# Patient Record
Sex: MALE | Race: WHITE | URBAN - METROPOLITAN AREA
[De-identification: names, ages, dates, MRNs, and addresses within clinical notes are randomized per-mention and may not be internally consistent; named-entity substitution may affect disease eponyms.]

---

## 2017-02-14 ENCOUNTER — TRANSFERRED RECORDS (OUTPATIENT)
Dept: HEALTH INFORMATION MANAGEMENT | Facility: CLINIC | Age: 42
End: 2017-02-14

## 2017-02-14 LAB — EJECTION FRACTION: 60

## 2017-03-02 ENCOUNTER — OFFICE VISIT (OUTPATIENT)
Dept: FAMILY MEDICINE | Facility: CLINIC | Age: 42
End: 2017-03-02
Payer: COMMERCIAL

## 2017-03-02 VITALS
HEART RATE: 71 BPM | BODY MASS INDEX: 23.58 KG/M2 | SYSTOLIC BLOOD PRESSURE: 128 MMHG | DIASTOLIC BLOOD PRESSURE: 74 MMHG | HEIGHT: 70 IN | WEIGHT: 164.7 LBS | TEMPERATURE: 97.2 F | OXYGEN SATURATION: 97 %

## 2017-03-02 DIAGNOSIS — G40.909 SEIZURE DISORDER (H): ICD-10-CM

## 2017-03-02 DIAGNOSIS — I47.10 PSVT (PAROXYSMAL SUPRAVENTRICULAR TACHYCARDIA) (H): Primary | ICD-10-CM

## 2017-03-02 PROCEDURE — 36415 COLL VENOUS BLD VENIPUNCTURE: CPT | Performed by: INTERNAL MEDICINE

## 2017-03-02 PROCEDURE — 99214 OFFICE O/P EST MOD 30 MIN: CPT | Performed by: INTERNAL MEDICINE

## 2017-03-02 PROCEDURE — 80164 ASSAY DIPROPYLACETIC ACD TOT: CPT | Performed by: INTERNAL MEDICINE

## 2017-03-02 RX ORDER — DIVALPROEX SODIUM 500 MG
1000 TABLET, EXTENDED RELEASE 24 HR ORAL DAILY
Qty: 180 TABLET | Refills: 3 | Status: SHIPPED | OUTPATIENT
Start: 2017-03-02

## 2017-03-02 NOTE — LETTER
[address] [place] [address]    March 6, 2017    Horacio Roy  [address]  [facility] [address]          Dear Horacio,    This is within normal limits.    Results for orders placed or performed in visit on 03/02/17   Valproic acid   Result Value Ref Range    Valproic Acid Level 65 50 - 100 mg/L       If you have any questions or concerns, please me or my clinic team at [phone].      Sincerely,        Kimani Esquivel MD/bt

## 2017-03-02 NOTE — NURSING NOTE
"Chief Complaint   Patient presents with     Hospital F/U       Initial /74 (BP Location: Left arm, Patient Position: Chair, Cuff Size: Adult Large)  Pulse 71  Temp 97.2  F (36.2  C)  Ht 5' 10.47\" (1.79 m)  Wt 164 lb 11.2 oz (74.7 kg)  SpO2 97%  BMI 23.32 kg/m2 Estimated body mass index is 23.32 kg/(m^2) as calculated from the following:    Height as of this encounter: 5' 10.47\" (1.79 m).    Weight as of this encounter: 164 lb 11.2 oz (74.7 kg).  Medication Reconciliation: complete   Loreto Avilez MA      "

## 2017-03-02 NOTE — PROGRESS NOTES
SUBJECTIVE:                                                    Horacio Roy is a 41 year old male who presents to clinic today for the following health issues:       PSVT (paroxysmal supraventricular tachycardia) (H)  Seizure disorder (H)     A patient I have seen only 1-2 times before, generally in excellent health. Since our last office visit he got , now wife has 2 horses, has a farm now, a lot over the last year. Also got a puppy, lots of home issues .     Meanwhile today is a post hospital discharge follow up office visit , for complete details please see care everywhere, he was at [facility] Follow-up Visit:    Hospital/Nursing Home/IP Rehab Facility: [facility] [place]  Date of Admission: 2/24/17  Date of Discharge: 2/25/17  Reason(s) for Admission: Tacacardia            Problems taking medications regularly:  None       Medication changes since discharge: None       Problems adhering to non-medication therapy:  None    Summary of hospitalization:  CareEverywhere information obtained and reviewed  Diagnostic Tests/Treatments reviewed.  Follow up needed: none  Other Healthcare Providers Involved in Patient s Care:         [facility]  Update since discharge: improved.     Post Discharge Medication Reconciliation: discharge medications reconciled and changed, per note/orders (see AVS).  Plan of care communicated with patient     Coding guidelines for this visit:  Type of Medical   Decision Making Face-to-Face Visit       within 7 Days of discharge Face-to-Face Visit        within 14 days of discharge   Moderate Complexity 16656 [id number]   High Complexity 24482 44279            Problem list and histories reviewed & adjusted, as indicated.  Additional history: as documented    Patient Active Problem List   Diagnosis     Seizure disorder (HCC)     Hyperlipidemia with target LDL less than 160     Increased frequency of urination     Past Surgical History   Procedure Laterality Date     Hernia  "repair       Orthopedic surgery  1998     minh procedure       Social History   Substance Use Topics     Smoking status: Never Smoker     Smokeless tobacco: Not on file     Alcohol use Yes     History reviewed. No pertinent family history.      Current Outpatient Prescriptions   Medication Sig Dispense Refill     DEPAKOTE  MG 24 hr tablet Take 2 tablets (1,000 mg) by mouth daily 180 tablet 3     sildenafil (VIAGRA) 100 MG tablet Take 0.5-1 tablets ( mg) by mouth daily as needed for erectile dysfunction Take 30 min to 4 hours before intercourse.  Never use with nitroglycerin, terazosin or doxazosin. 6 tablet 11     [DISCONTINUED] DEPAKOTE  MG 24 hr tablet Take 2 tablets (1,000 mg) by mouth daily 180 tablet 3     No Known Allergies  BP Readings from Last 3 Encounters:   03/02/17 128/74   03/31/16 100/68   04/27/15 110/67    Wt Readings from Last 3 Encounters:   03/02/17 164 lb 11.2 oz (74.7 kg)   03/31/16 166 lb (75.3 kg)   04/27/15 169 lb 6.4 oz (76.8 kg)                  Labs reviewed in EPIC    Reviewed and updated as needed this visit by clinical staff       Reviewed and updated as needed this visit by Provider         ROS:  Constitutional, HEENT, cardiovascular, pulmonary, gi and gu systems are negative, except as otherwise noted.    OBJECTIVE:                                                    /74 (BP Location: Left arm, Patient Position: Chair, Cuff Size: Adult Large)  Pulse 71  Temp 97.2  F (36.2  C)  Ht 5' 10.47\" (1.79 m)  Wt 164 lb 11.2 oz (74.7 kg)  SpO2 97%  BMI 23.32 kg/m2  Body mass index is 23.32 kg/(m^2).  GENERAL APPEARANCE: healthy, alert and no distress  EYES: Eyes grossly normal to inspection, PERRL and conjunctivae and sclerae normal  RESP: lungs clear to auscultation - no rales, rhonchi or wheezes  CV: regular rates and rhythm, normal S1 S2, no S3 or S4 and no murmur, click or rub  NEURO: Normal strength and tone, mentation intact and speech normal  PSYCH: mentation " appears normal and affect normal/bright    Diagnostic test results:  Diagnostic Test Results:  Orders Placed This Encounter   Procedures     Valproic acid          ASSESSMENT/PLAN:                                                    1. PSVT (paroxysmal supraventricular tachycardia) (H)  The long and short of it is that this gentleman had a new problem, a burst of a rapid heart action  That is empirically diagnosed as an supraventricular tachycardia. Further cardiac monitoring is not to be pursued. He was educated in the valsalva maneuver and is symptoms free at this point. Most of today was spent in education. No further follow up beyond the annual office visit unless symptoms / on an as needed basis       2. Seizure disorder (H)  An an unrelated matter. Has a history of a seizure disorder and continues with valproic acid [ depakote ]    - DEPAKOTE  MG 24 hr tablet; Take 2 tablets (1,000 mg) by mouth daily  Dispense: 180 tablet; Refill: 3      Follow up with Provider - 6-12 months / on an as needed basis      Kimani Esquivel MD  [facility]

## 2017-03-02 NOTE — MR AVS SNAPSHOT
After Visit Summary   3/2/2017    Horacio Roy    MRN: [id number]           Patient Information     Date Of Birth          1975        Visit Information        Provider Department      3/2/2017 5:50 PM Kimani Esquivel MD [facility]        Today's Diagnoses     PSVT (paroxysmal supraventricular tachycardia) (H)    -  1    Seizure disorder (H)          Care Instructions    [facility]    If you have any questions regarding to your visit please contact your care team:     Team Pink:   Clinic Hours Telephone Number   Internal Medicine:  Dr. Monique Castillo NP       7am-7pm  Monday - Thursday   7am-5pm  Fridays  [phone]  (Appointment scheduling available 24/7)    Questions about your visit?  Team Line  [phone]   Urgent Care - [place] and [place] - 11am-9pm Monday-Friday Saturday-Sunday- 9am-5pm   [place] - 5pm-9pm Monday-Friday Saturday-Sunday- 9am-5pm  [phone] - [place]   [phone] - [place]       What options do I have for visits at the clinic other than the traditional office visit?  To expand how we care for you, many of our providers are utilizing electronic visits (e-visits) and telephone visits, when medically appropriate, for interactions with their patients rather than a visit in the clinic.   We also offer nurse visits for many medical concerns. Just like any other service, we will bill your insurance company for this type of visit based on time spent on the phone with your provider. Not all insurance companies cover these visits. Please check with your medical insurance if this type of visit is covered. You will be responsible for any charges that are not paid by your insurance.      E-visits via [employer]:  generally incur a $35.00 fee.  Telephone visits:  Time spent on the phone: *charged based on time that is spent on the phone in increments of 10 minutes. Estimated cost:   5-10 mins  "$30.00   11-20 mins. $59.00   21-30 mins. $85.00   Use [employer]usehart (secure email communication and access to your chart) to send your primary care provider a message or make an appointment. Ask someone on your Team how to sign up for [employer].    For a Price Quote for your services, please call our "[employer]" Line at [phone].    As always, Thank you for trusting us with your health care needs!            Follow-ups after your visit        Who to contact     If you have questions or need follow up information about today's clinic visit or your schedule please contact [facility] directly at [phone].  Normal or non-critical lab and imaging results will be communicated to you by LoopFusehart, letter or phone within 4 business days after the clinic has received the results. If you do not hear from us within 7 days, please contact the clinic through [employer] or phone. If you have a critical or abnormal lab result, we will notify you by phone as soon as possible.  Submit refill requests through [facility] or call your pharmacy and they will forward the refill request to us. Please allow 3 business days for your refill to be completed.          Additional Information About Your Visit        LoopFusehart Information     [facility] gives you secure access to your electronic health record. If you see a primary care provider, you can also send messages to your care team and make appointments. If you have questions, please call your primary care clinic.  If you do not have a primary care provider, please call [phone] and they will assist you.        Care EveryWhere ID     This is your Care EveryWhere ID. This could be used by other organizations to access your [place] medical records  [id number]        Your Vitals Were     Pulse Temperature Height Pulse Oximetry BMI (Body Mass Index)       71 97.2  F (36.2  C) 5' 10.47\" (1.79 m) 97% 23.32 kg/m2        Blood Pressure from Last 3 Encounters:   03/02/17 128/74 "   03/31/16 100/68   04/27/15 110/67    Weight from Last 3 Encounters:   03/02/17 164 lb 11.2 oz (74.7 kg)   03/31/16 166 lb (75.3 kg)   04/27/15 169 lb 6.4 oz (76.8 kg)              We Performed the Following     Valproic acid          Where to get your medicines      These medications were sent to [facility] - [place], [place] - [address]  [address] [facility] [address]    Hours:  Mon-Fri 8:30am-5:00pm Toll Free [phone] Phone:  [phone]     DEPAKOTE  MG 24 hr tablet          Primary Care Provider Office Phone # Fax #    Kimani Esquivel [phone]       [facility] [address] [facility] [address]        Thank you!     Thank you for choosing [facility]  for your care. Our goal is always to provide you with excellent care. Hearing back from our patients is one way we can continue to improve our services. Please take a few minutes to complete the written survey that you may receive in the mail after your visit with us. Thank you!             Your Updated Medication List - Protect others around you: Learn how to safely use, store and throw away your medicines at www.disposemymeds.org.          This list is accurate as of: 3/2/17  6:12 PM.  Always use your most recent med list.                   Brand Name Dispense Instructions for use    DEPAKOTE  MG 24 hr tablet   Generic drug:  divalproex     180 tablet    Take 2 tablets (1,000 mg) by mouth daily       sildenafil 100 MG cap/tab    VIAGRA    6 tablet    Take 0.5-1 tablets ( mg) by mouth daily as needed for erectile dysfunction Take 30 min to 4 hours before intercourse.  Never use with nitroglycerin, terazosin or doxazosin.

## 2017-03-03 LAB — VALPROATE SERPL-MCNC: 65 MG/L (ref 50–100)

## 2017-03-03 NOTE — PATIENT INSTRUCTIONS
[facility]    If you have any questions regarding to your visit please contact your care team:     Team Pink:   Clinic Hours Telephone Number   Internal Medicine:  Dr. Monique Castillo NP       7am-7pm  Monday - Thursday   7am-5pm  Fridays  [phone]  (Appointment scheduling available 24/7)    Questions about your visit?  Team Line  [phone]   Urgent Care - [place] [facility] - 11am-9pm Monday-Friday Saturday-Sunday- 9am-5pm   [place] - 5pm-9pm Monday-Friday Saturday-Sunday- 9am-5pm  [phone] - [place]   [phone] - [place]       What options do I have for visits at the clinic other than the traditional office visit?  To expand how we care for you, many of our providers are utilizing electronic visits (e-visits) and telephone visits, when medically appropriate, for interactions with their patients rather than a visit in the clinic.   We also offer nurse visits for many medical concerns. Just like any other service, we will bill your insurance company for this type of visit based on time spent on the phone with your provider. Not all insurance companies cover these visits. Please check with your medical insurance if this type of visit is covered. You will be responsible for any charges that are not paid by your insurance.      E-visits via [employer]:  generally incur a $35.00 fee.  Telephone visits:  Time spent on the phone: *charged based on time that is spent on the phone in increments of 10 minutes. Estimated cost:   5-10 mins $30.00   11-20 mins. $59.00   21-30 mins. $85.00   Use [employer] (secure email communication and access to your chart) to send your primary care provider a message or make an appointment. Ask someone on your Team how to sign up for [employer].    For a Price Quote for your services, please call our Consumer Price Line at [phone].    As always, Thank you for trusting us with your health care needs!

## 2020-02-08 ENCOUNTER — HEALTH MAINTENANCE LETTER (OUTPATIENT)
Age: 45
End: 2020-02-08